# Patient Record
Sex: FEMALE | Race: WHITE | NOT HISPANIC OR LATINO | URBAN - METROPOLITAN AREA
[De-identification: names, ages, dates, MRNs, and addresses within clinical notes are randomized per-mention and may not be internally consistent; named-entity substitution may affect disease eponyms.]

---

## 2024-10-23 ENCOUNTER — OFFICE VISIT (OUTPATIENT)
Dept: URGENT CARE | Age: 34
End: 2024-10-23
Payer: COMMERCIAL

## 2024-10-23 VITALS
WEIGHT: 234 LBS | HEART RATE: 88 BPM | RESPIRATION RATE: 18 BRPM | HEIGHT: 66 IN | DIASTOLIC BLOOD PRESSURE: 58 MMHG | OXYGEN SATURATION: 95 % | BODY MASS INDEX: 37.61 KG/M2 | TEMPERATURE: 97.5 F | SYSTOLIC BLOOD PRESSURE: 95 MMHG

## 2024-10-23 DIAGNOSIS — R21 RASH: Primary | ICD-10-CM

## 2024-10-23 RX ORDER — METHYLPREDNISOLONE 4 MG/1
TABLET ORAL
Qty: 21 TABLET | Refills: 0 | Status: SHIPPED | OUTPATIENT
Start: 2024-10-23 | End: 2024-10-30

## 2024-10-23 RX ORDER — PREDNISONE 20 MG/1
TABLET ORAL
Qty: 11 TABLET | Refills: 0 | Status: SHIPPED | OUTPATIENT
Start: 2024-10-23 | End: 2024-10-23

## 2024-10-23 RX ORDER — DULOXETIN HYDROCHLORIDE 60 MG/1
60 CAPSULE, DELAYED RELEASE ORAL DAILY
COMMUNITY

## 2024-10-23 NOTE — PROGRESS NOTES
"Subjective   Patient ID: Stephenie Goins is a 33 y.o. female. They present today with a chief complaint of Rash (Itchy rash spreading over various body areas x 2 weeks. Arms, legs, face, chest).    HISTORY OF PRESENT ILLNESS:    Patient presents to the clinic for 2 week history of itchy rash. States she initially noticed the rash on her left forearm. It has since spread to the opposite arm, face, behind the ears, and in genital region. Describes as raised, red, itchy bumps. Denies new environmental exposures to her knowledge. Feeling well otherwise.    Past Medical History  Allergies as of 10/23/2024 - Reviewed 10/23/2024   Allergen Reaction Noted    Augmentin [amoxicillin-pot clavulanate] Unknown 10/23/2024    Compazine [prochlorperazine] Unknown 10/23/2024    Meloxicam Unknown 10/23/2024    Reglan [metoclopramide hcl] Unknown 10/23/2024       Current Outpatient Medications   Medication Instructions    DULoxetine (CYMBALTA) 60 mg, oral, Daily, Do not crush or chew.    methylPREDNISolone (Medrol Dospak) 4 mg tablets Follow schedule on package instructions. Take with food.         No past medical history on file.    No past surgical history on file.     reports that she has never smoked. She has never used smokeless tobacco.    Review of Systems    Review of Systems   Skin:  Positive for rash.   All other systems reviewed and are negative.                                Objective    Vitals:    10/23/24 1229   BP: 95/58   BP Location: Right arm   Patient Position: Sitting   BP Cuff Size: Large adult   Pulse: 88   Resp: 18   Temp: 36.4 °C (97.5 °F)   TempSrc: Oral   SpO2: 95%   Weight: 106 kg (234 lb)   Height: 1.676 m (5' 6\")     Patient's last menstrual period was 10/15/2024 (exact date).  PHYSICAL EXAMINATION:    Physical Exam  Vitals and nursing note reviewed.   Constitutional:       General: She is not in acute distress.     Appearance: Normal appearance. She is not ill-appearing.   HENT:      Head: Normocephalic and " atraumatic.      Nose: Nose normal.   Eyes:      General:         Right eye: No discharge.         Left eye: No discharge.      Extraocular Movements: Extraocular movements intact.      Conjunctiva/sclera: Conjunctivae normal.   Cardiovascular:      Rate and Rhythm: Normal rate.   Pulmonary:      Effort: Pulmonary effort is normal. No respiratory distress.   Musculoskeletal:      Cervical back: Normal range of motion and neck supple.   Skin:     General: Skin is warm and dry.      Findings: Rash present. Rash is not nodular, purpuric, pustular or vesicular.      Comments: Papular, pruritic, non-draining/non-crusted patches to extensor aspect of left elbow, flexor aspect of right forearm, anterior chest, and post auricular region. Consistent with possible non specific dermatitis.    Neurological:      General: No focal deficit present.      Mental Status: She is alert and oriented to person, place, and time.   Psychiatric:         Mood and Affect: Mood normal.         Behavior: Behavior normal.        Procedures    No results found for this visit on 10/23/24.    Diagnostic study results (if any) were reviewed by Kia Stratton PA-C.    Assessment/Plan   Allergies, medications, history, and pertinent labs/EKGs/Imaging reviewed by Kia Stratton PA-C.     Orders and Diagnoses  Diagnoses and all orders for this visit:  Rash  -     methylPREDNISolone (Medrol Dospak) 4 mg tablets; Follow schedule on package instructions. Take with food.      Medical Admin Record    Given overall well appearance, vital signs, history, and exam as above, there is no indication for further emergent testing/intervention at this time.      I discussed with the patient my clinical thoughts at this time given the above and we had a shared decision-making conversation in a patient-centered decision-making model on how to proceed forward. Pt was instructed on the importance of close follow-up. They were told that an urgent care diagnosis is  often a preliminary impression and that definitive care is often not able to be given in the urgent care setting. Pt was educated that close follow-up is essential for good health and good outcomes. Patient was provided with the following instructions:         Take oral steroid as directed.       May take Benadryl for nighttime itching if needed.    Daily moisturizer.     Good hand hygiene. Avoid scratching as much as able.     Follow up with PCP or dermatology in the next 7-10 days if sx fail to show adequate improvement.         Clinical impression as well as limitations of available testing/examination, all discussed with patient. Pt is well at this time in the urgent care. They are comfortable with the present assessment and plan to be discharged home. Discussed with them close outpatient follow up, reassessment, and possible further testing/treatment via their PCP/specialist if symptoms fail to improve; they agree, understand, and are comfortable with this plan. Pt given the opportunity to ask questions prior to discharge and all questions were answered at this time. Via our discussion, the patient was advised of warning signs and instructions were reviewed. Strict ED precautions were given, acknowledged, and understood. Discussed with the patient/family that it is okay to return to the urgent care at any time for anything. Advised to present to the ED if present condition changes/worsens or if they develop new symptoms at any time after discharge. Also, advised to go to the ED if they cannot establish outpatient follow-up in time frame specified above. Pt verbalized understanding and agreement with plan and instructions. Discussed the need for close follow up with their primary care provider and/or specialist for further testing/treatment/care/consultation in the short time frame as noted above, if needed - they understand these instructions and agree to close follow up for these reasons. Patient discharged  home in stable condition.      Follow Up Instructions  No follow-ups on file.    Patient disposition: Home    Electronically signed by Kia Stratton PA-C  1:58 PM